# Patient Record
Sex: FEMALE | Race: WHITE | NOT HISPANIC OR LATINO | Employment: OTHER | URBAN - METROPOLITAN AREA
[De-identification: names, ages, dates, MRNs, and addresses within clinical notes are randomized per-mention and may not be internally consistent; named-entity substitution may affect disease eponyms.]

---

## 2017-01-05 ENCOUNTER — ALLSCRIPTS OFFICE VISIT (OUTPATIENT)
Dept: OTHER | Facility: OTHER | Age: 57
End: 2017-01-05

## 2017-02-20 ENCOUNTER — GENERIC CONVERSION - ENCOUNTER (OUTPATIENT)
Dept: OTHER | Facility: OTHER | Age: 57
End: 2017-02-20

## 2017-05-24 DIAGNOSIS — N18.4 CHRONIC KIDNEY DISEASE, STAGE IV (SEVERE) (HCC): ICD-10-CM

## 2017-05-24 DIAGNOSIS — N05.1 NEPHRITIC SYNDROME WITH FOCAL AND SEGMENTAL GLOMERULAR LESION: ICD-10-CM

## 2017-05-24 DIAGNOSIS — E55.9 VITAMIN D DEFICIENCY: ICD-10-CM

## 2017-05-24 DIAGNOSIS — N18.30 CHRONIC KIDNEY DISEASE, STAGE III (MODERATE) (HCC): ICD-10-CM

## 2017-05-26 ENCOUNTER — ALLSCRIPTS OFFICE VISIT (OUTPATIENT)
Dept: OTHER | Facility: OTHER | Age: 57
End: 2017-05-26

## 2018-01-11 NOTE — MISCELLANEOUS
Message  I had tried to call Caroline Melton today at approximately 5:30 PM I had left a message  I had spoken with Dr Sonia Garcia from [facility] earlier this week on Wednesday  I had reviewed Gwen's recent labs as well as her progress  I had stated that it been 1 year since I had seen her in follow-up  Most recent creatinine was 2 4  Given the reactions that Mahi Doll has had to the immunosuppressive medication including Prograf prednisone and Acthar as well as of not being effective in helping reduce her proteinuria unless she had severe hypoalbuminemia the risks would outweigh the benefits in terms of utilizing immunosuppressive therapy at this point  I left a message with Mahi Doll stating that I had spoken with Dr Sonia Garcia  In addition, I stated her most repeat creatinine was 2 1  Note her serum albumin is 3 7  I would want to try low-dose ACE inhibitor or ARB  I will do a follow-up phone call I did leave a message concerning the above today on October 27   Thank you      Signatures   Electronically signed by : Nimisha Burton DO; Oct 27 2016  6:20PM EST                       (Author)

## 2018-01-14 VITALS
BODY MASS INDEX: 36.37 KG/M2 | HEIGHT: 68 IN | DIASTOLIC BLOOD PRESSURE: 80 MMHG | WEIGHT: 240 LBS | SYSTOLIC BLOOD PRESSURE: 114 MMHG

## 2018-01-14 VITALS
HEIGHT: 68 IN | WEIGHT: 248 LBS | DIASTOLIC BLOOD PRESSURE: 92 MMHG | BODY MASS INDEX: 37.59 KG/M2 | SYSTOLIC BLOOD PRESSURE: 132 MMHG

## 2018-01-16 NOTE — MISCELLANEOUS
Message  I had spoken with patient and her  on the phone this past tuesday  They had called me on my cell; on her prior visit after the recent hospitalization in [place], I had asked her to see cardiology in followup given the was found to have PAF and was asymptomatic  I appreciated her going to see the cardiologist in followup; she had called because of thePAF they were recommending anticoagulation  We spoke of the NOACS versus coumadin as the heart doctor was recommending coumadin given the CKD  I agreed with this thought process and thought coumadin was the safest option  I stated some of her lab work was not yet back and I would call her in folllowup   I will call [facility]      Signatures   Electronically signed by : Jurgen Patel DO; Sep 16 2016  9:33AM EST                       (Author)

## 2018-01-16 NOTE — MISCELLANEOUS
Message  I was called by the patent on friday as she was concerned and asking me about anticoagulation  Shehad seen her cardiologist and had been having more bouts of PAF  She is at increased risk of stroke  We talked about the use of eliqiuis versus coumadin  i explained that both do increase the risk of significant bleeding that she is aware of  In addition, w talked about the fact that coumadin does require close ,monitoring of levels and that she was very focused that an antidote was readily available for coumadin  I explained that eliqiuis is able to be dosed in ckd but again both did significantly increase the risk of bleeding  She is going to discuss coumadin use with cardiology further, she does understand the increased stroke risk with PAF  her last cr was 2 1 and albumin was 3 8  repeat labs and for followup in January but at this point based on my conversation with dr Bishop Cobb from [place] no further immunosuppression at this time unless there was a significant decrease in her serum albumin levels and this would have to be looked at        Signatures   Electronically signed by : Bill Ford DO; Dec 12 2016 10:36PM EST                       (Author)

## 2018-07-13 DIAGNOSIS — N18.4 CHRONIC KIDNEY DISEASE, STAGE 4 (SEVERE) (HCC): Primary | ICD-10-CM

## 2018-07-18 ENCOUNTER — DOCUMENTATION (OUTPATIENT)
Dept: NEPHROLOGY | Facility: CLINIC | Age: 58
End: 2018-07-18

## 2018-07-18 LAB
1,25(OH)2D3 SERPL-MCNC: 12 PG/ML
CREAT ?TM UR-SCNC: 120 UMOL/L
EXT BLOOD, UA: NORMAL
EXT GLUCOSE BLD: 134
EXT GLUCOSE, UA: NORMAL
EXT KETONES: NORMAL
EXT NITRITE, UA: NORMAL
EXT PH, UA: 5
EXT PROTEIN URINE: 601.8
EXT PROTEIN, UA: >500
EXT SPECIFIC GRAVITY, UA: 1.01
EXTERNAL ALBUMIN: 3.4
EXTERNAL ALK PHOS: 75
EXTERNAL ALT: 28
EXTERNAL ANION GAP: 9
EXTERNAL AST: 11
EXTERNAL BACTERIA (UA): NORMAL
EXTERNAL BUN: 58
EXTERNAL CALCIUM: 5.9
EXTERNAL CHLORIDE: 109
EXTERNAL CO2: 23
EXTERNAL CREATININE: 3.99
EXTERNAL EGFR: 12
EXTERNAL MAGNESIUM: 2.1
EXTERNAL PHOSPHORUS: 6.5
EXTERNAL POTASSIUM: 5.2
EXTERNAL RBC (UA): NORMAL
EXTERNAL SODIUM: 141
EXTERNAL T.BILIRUBIN: 0.4
EXTERNAL TOTAL PROTEIN: 7
EXTERNAL WBC (UA): NORMAL
HCT VFR BLD AUTO: 34.6 % (ref 34.8–46.1)
HGB BLD-MCNC: 11.3 G/DL (ref 11.5–15.4)
PLATELET # BLD AUTO: 165 THOUSANDS/UL (ref 149–390)
PROT/CREAT UR: 5.02 MG/G{CREAT}
WBC # BLD AUTO: 7.3 THOUSAND/UL
WBC # BLD EST: 25 10*3/UL

## 2018-07-18 NOTE — PROGRESS NOTES
Received a call from [facility]  Critically low calcium of 5 9   CR 3 99  UPC 5 02  Per Dr Debra Carias, pt to go to ER  Called patient and left two message explaining above  Was not able to talk  directly to patient as she did not answer

## 2018-07-19 ENCOUNTER — TELEPHONE (OUTPATIENT)
Dept: NEPHROLOGY | Facility: CLINIC | Age: 58
End: 2018-07-19

## 2018-07-19 NOTE — TELEPHONE ENCOUNTER
Yesterday afternoon, I was notified as to abnormal blood work by the patient  A calcium level was 5 9 with an albumin of 3 4  Given the hypocalcemia which was acute from her prior labs I had related to message that the patient should go to the emergency room further evaluation  Her protein creatinine ratio has been stable as what has been although she has had worsening kidney function  I was called by the ER physician last night at approximately 10:30 p m     The question was how to best manage this Huang  Patient was having some symptoms related to hypocalcemia including muscle spasm contraction at least as per my conversation with the ER physician  Her phosphorus level was 6 5  I had asked that an EKG had been done to get a QT interval   In addition given her hypocalcemia if she was symptomatic she may require some intravenous calcium placement  My concern with regards for the patient's safety with regards Level hypocalcemia which I explained to the ER physician was why I had as a message to be given to the patient to go to the ER in the 1st place  I am scheduled to see her in the office tomorrow to further discuss her kidney function and to make a further plan of care  The ER id is from [facility] of was responding to number from page I had gotten 1 my pager last night with regards to this patient and probably respond to that agent spoke with position in the emergency room concern is patient and my concern for her safety as well as my concern given that she possibly could have symptomatic hypocalcemia given the level of what her calcium levels were in the lab work

## 2018-07-19 NOTE — PROGRESS NOTES
Yesterday afternoon, I was notified as to abnormal blood work by the patient  A calcium level was 5 9 with an albumin of 3 4  Given the hypocalcemia which was acute from her prior labs I had related to message that the patient should go to the emergency room further evaluation  Her protein creatinine ratio has been stable as what has been although she has had worsening kidney function  I was called by the ER physician last night at approximately 10:30 p m     The question was how to best manage this Huang  Patient was having some symptoms related to hypocalcemia including muscle spasm contraction at least as per my conversation with the ER physician  Her phosphorus level was 6 5  I had asked that an EKG had been done to get a QT interval   In addition given her hypocalcemia if she was symptomatic she may require some intravenous calcium placement  My concern with regards for the patient's safety with regards Level hypocalcemia which I explained to the ER physician was why I had as a message to be given to the patient to go to the ER in the 1st place  I am scheduled to see her in the office tomorrow to further discuss her kidney function and to make a further plan of care  The ER id is from [facility] of was responding to number from page I had gotten 1 my pager last night with regards to this patient and probably respond to that agent spoke with position in the emergency room concern is patient and my concern for her safety as well as my concern given that she possibly could have symptomatic hypocalcemia given the level of what her calcium levels were in the lab work

## 2018-07-20 ENCOUNTER — OFFICE VISIT (OUTPATIENT)
Dept: NEPHROLOGY | Facility: CLINIC | Age: 58
End: 2018-07-20
Payer: COMMERCIAL

## 2018-07-20 VITALS — HEIGHT: 68 IN | BODY MASS INDEX: 38.19 KG/M2 | WEIGHT: 252 LBS

## 2018-07-20 DIAGNOSIS — D63.1 ANEMIA IN STAGE 4 CHRONIC KIDNEY DISEASE (HCC): ICD-10-CM

## 2018-07-20 DIAGNOSIS — N05.1 FOCAL SEGMENTAL GLOMERULOSCLEROSIS: ICD-10-CM

## 2018-07-20 DIAGNOSIS — I48.0 PAROXYSMAL ATRIAL FIBRILLATION (HCC): ICD-10-CM

## 2018-07-20 DIAGNOSIS — N18.4 ANEMIA IN STAGE 4 CHRONIC KIDNEY DISEASE (HCC): ICD-10-CM

## 2018-07-20 DIAGNOSIS — N25.81 SECONDARY HYPERPARATHYROIDISM (HCC): ICD-10-CM

## 2018-07-20 DIAGNOSIS — N18.4 CKD (CHRONIC KIDNEY DISEASE), STAGE IV (HCC): ICD-10-CM

## 2018-07-20 DIAGNOSIS — E83.51 HYPOCALCEMIA: Primary | ICD-10-CM

## 2018-07-20 PROBLEM — N18.9 ANEMIA IN CHRONIC KIDNEY DISEASE: Status: ACTIVE | Noted: 2018-07-20

## 2018-07-20 PROCEDURE — 99214 OFFICE O/P EST MOD 30 MIN: CPT | Performed by: INTERNAL MEDICINE

## 2018-07-20 RX ORDER — MULTIVITAMIN
1 TABLET ORAL DAILY
COMMUNITY

## 2018-07-20 RX ORDER — OMEGA-3 FATTY ACIDS CAP DELAYED RELEASE 1000 MG 1000 MG
CAPSULE DELAYED RELEASE ORAL
COMMUNITY

## 2018-07-20 RX ORDER — ERGOCALCIFEROL (VITAMIN D2) 1250 MCG
50000 CAPSULE ORAL WEEKLY
COMMUNITY

## 2018-07-20 RX ORDER — CALCITRIOL 0.25 UG/1
0.25 CAPSULE, LIQUID FILLED ORAL DAILY
COMMUNITY
End: 2018-08-23 | Stop reason: SDUPTHER

## 2018-07-20 NOTE — ASSESSMENT & PLAN NOTE
The patient stated that she likely felt that she had episodes of paroxysmal atrial fibrillation  She is due to see Cardiology this afternoon  I explained that they would likely be talking with her about possible anticoagulation given that she has had episodes of atrial fibrillation over the past several months  Her chads Vasc score may be about 2 given that she is a female in history of hypertension  I stated she did speak further about this with Cardiology and she is due to see Dr Low Chow whom she had seen in the past later this afternoon

## 2018-07-20 NOTE — ASSESSMENT & PLAN NOTE
Phosphorus is 6 5 will check intact PTH patient had symptomatic hypocalcemia as noted above    Add phosphorus binder calcium based phosphorus binder

## 2018-07-20 NOTE — ASSESSMENT & PLAN NOTE
I have not seen the patient in follow-up since May 2017  Note the patient has a history of biopsy-proven focal segmental glomerulosclerosis  Over the past several years, she has been on prior medication regimens including Prograf, prednisone, and ACTHAR which did not have a significant impact on her kidney function or her proteinuria  At this point I think were in more the advanced stage of FSGS    She does have proteinuria and a protein creatinine ratio 9 g which is higher than it had been in the past as it was 5 g in 2017

## 2018-07-20 NOTE — ASSESSMENT & PLAN NOTE
The patient on the blood work that she had done in the middle of the week had a calcium of 5 9 and albumin in the mid threes and corrected was in the low 6 range  I had asked that the patient go to the emergency room  I had spoken with the ER physician when I was on call on Wednesday  The patient was admitted for symptomatic hypocalcemia  Hospitalization notes were reviewed  I spoke with the nephrologist and the patient had left against medical advice yesterday  She is no longer having symptoms of hypocalcemia  She denies any perioral numbness  No twitching of her hands no numbness or tingling  She did receive intravenous calcium yesterday  She had repeat labs this morning which are pending  I am going to maintain a daily Rocaltrol but decreased to once daily, maintain the all [place]scal but decrease it to twice daily in between meals and maintain the calcium acetate 1 with each meal for hyperphosphatemia  Intact PTH is pending  She has low vitamin-D levels which is also contributing and she [place] [address] units of vitamin D2 which we are also going to maintain     Will recheck a CMP and phosphorus this coming Tuesday

## 2018-07-20 NOTE — PROGRESS NOTES
Assessment/Plan:    CKD (chronic kidney disease), stage IV ([facility])  The S the Patient has stage 4 chronic kidney disease  She has biopsy-proven focal segmental glomerulosclerosis  The patient I last saw here in May 2017  At that time, her creatinine 2 3-2 7 range  It is likely that her kidney function has represented a progression of underlying focal segmental glomerulosclerosis  When I had gotten her labs in the middle of the week, her creatinine had been 3 9 but her calcium corrected was in the low 6 range she was admitted with symptomatic hypocalcemia  I spoke with the rounding physician/nephrologist in the hospital yesterday and her creatinine had been 4 1  During today's visit we had talked about getting ready for dialysis and different modalities of dialysis     This was a lot for her to take in and she was mildly to her full during his visit as we talked about preparation for dialysis  I did offer her as well the kidney based probiotic renadyl to try and see if this would help to delay time with regards to initiating dialysis  I am going to start her on the kidney based probiotic 1 tablet twice daily if she tolerates increasing it to 3 tablets a day taken with meals  In addition, I asked her to think about different modalities and we will set up modality review with regards to different types of dialysis which she was amenable to  Will also check a repeat BMP in approximately 4 weeks as well after she has been on a probiotic for couple weeks  I explained that in my own experience, I had about a 50% success rate in terms of lowering the creatinine level    I think his I explained my hypothesis is that there is likely a difference in everyone's individual bowel carola however we would follow her kidney functions how she responds    Hypocalcemia  The patient on the blood work that she had done in the middle of the week had a calcium of 5 9 and albumin in the mid threes and corrected was in the low 6 range   I had asked that the patient go to the emergency room  I had spoken with the ER physician when I was on call on Wednesday  The patient was admitted for symptomatic hypocalcemia  Hospitalization notes were reviewed  I spoke with the nephrologist and the patient had left against medical advice yesterday  She is no longer having symptoms of hypocalcemia  She denies any perioral numbness  No twitching of her hands no numbness or tingling  She did receive intravenous calcium yesterday  She had repeat labs this morning which are pending  I am going to maintain a daily Rocaltrol but decreased to once daily, maintain the all [place]scal but decrease it to twice daily in between meals and maintain the calcium acetate 1 with each meal for hyperphosphatemia  Intact PTH is pending  She has low vitamin-D levels which is also contributing and she was started in the hospital on [address] units of vitamin D2 which we are also going to maintain   Will recheck a CMP and phosphorus this coming Tuesday    Secondary hyperparathyroidism ([address] )  Phosphorus is 6 5 will check intact PTH patient had symptomatic hypocalcemia as noted above  Add phosphorus binder calcium based phosphorus binder    Focal segmental glomerulosclerosis  I have not seen the patient in follow-up since May 2017  Note the patient has a history of biopsy-proven focal segmental glomerulosclerosis  Over the past several years, she has been on prior medication regimens including Prograf, prednisone, and ACTHAR which did not have a significant impact on her kidney function or her proteinuria  At this point I think were in more the advanced stage of FSGS  She does have proteinuria and a protein creatinine ratio 9 g which is higher than it had been in the past as it was 5 g in 2017    Anemia in chronic kidney disease  Her hemoglobin is stable 11 2      Paroxysmal atrial fibrillation (H[facility])  The patient stated that she likely felt that she had episodes of paroxysmal atrial fibrillation  She is due to see Cardiology this afternoon  I explained that they would likely be talking with her about possible anticoagulation given that she has had episodes of atrial fibrillation over the past several months  Her chads Vasc score may be about 2 given that she is a female in history of hypertension  I stated she did speak further about this with Cardiology and she is due to see Dr Lora Randolph whom she had seen in the past later this afternoon  I also spoke with her about undergoing a transplant evaluation  I did mention that FSGS can recur in the transplanted kidney, however, I did urge her as well to consider undergoing a transplant evaluation  I will follow-up with her in 4 weeks and will discuss this further  All questions answered during this visit with Shannan Gomes and her   Subjective:      Patient ID: Gaurav Dickerson is a 56 y o  female  HPI    Patient is seen in follow-up for stage 4 chronic kidney disease  Last time I had seen Shannan Gomes here in the office was May 2017  She has a history of biopsy-proven focal segmental glomerulosclerosis  She had been taking care family members over the past year  She states that she may have had bouts of atrial fibrillation in the past year  Labs had been done in the middle of the week which demonstrated a creatinine of 3 9 calcium 5 5 with an albumin of 3 4  I had asked that the patient go to the emergency room  I was on call on Wednesday night and I explained to the ER physician as ania had gone to [facility] meal number that I was concerned about symptomatic hypocalcemia that the patient was experiencing as she had perioral numbness as well as twitching in her hands and numbness and tingling  The patient was hospitalized given intravenous calcium  The patient signed out against medical advice on Thursday if she was due to see me today    Her discharge medications were reviewed and she is no longer having symptoms of hypocalcemia  She denies any further numbness and tingling no perioral numbness  Medications were reviewed  I also had spoken with the [facility] nephrologist yesterday  Dr Tony Oneal  Review of Systems  currently, no nausea vomiting diarrhea abdominal discomfort  No dizziness no lightheadedness no urgency no frequency  There is no perioral numbness, no further twitching no numbness no tingling    Objective:      Ht 5' 8" (1 727 m)   Wt 114 kg (252 lb)   BMI 38 32 kg/m²     Blood pressure is 128/90 in the right arm     Physical Exam   Constitutional: She is oriented to person, place, and time  She appears well-nourished  Eyes: No scleral icterus  Neck: Neck supple  Cardiovascular: Normal rate and regular rhythm  Exam reveals no friction rub  Pulmonary/Chest: Breath sounds normal    Abdominal: Soft  Bowel sounds are normal    Musculoskeletal: She exhibits no edema  Lymphadenopathy:     She has no cervical adenopathy  Neurological: She is alert and oriented to person, place, and time  Skin: Skin is warm and dry       there is no carpal pedal spasm when patient's blood pressure was taken in the right arm

## 2018-07-20 NOTE — ASSESSMENT & PLAN NOTE
The S the Patient has stage 4 chronic kidney disease  She has biopsy-proven focal segmental glomerulosclerosis  The patient I last saw here in May 2017  At that time, her creatinine 2 3-2 7 range  It is likely that her kidney function has represented a progression of underlying focal segmental glomerulosclerosis  When I had gotten her labs in the middle of the week, her creatinine had been 3 9 but her calcium corrected was in the low 6 range she was admitted with symptomatic hypocalcemia  I spoke with the rounding physician/nephrologist in the hospital yesterday and her creatinine had been 4 1  During today's visit we had talked about getting ready for dialysis and different modalities of dialysis     This was a lot for her to take in and she was mildly to her full during his visit as we talked about preparation for dialysis  I did offer her as well the kidney based probiotic renadyl to try and see if this would help to delay time with regards to initiating dialysis  I am going to start her on the kidney based probiotic 1 tablet twice daily if she tolerates increasing it to 3 tablets a day taken with meals  In addition, I asked her to think about different modalities and we will set up modality review with regards to different types of dialysis which she was amenable to  Will also check a repeat BMP in approximately 4 weeks as well after she has been on a probiotic for couple weeks  I explained that in my own experience, I had about a 50% success rate in terms of lowering the creatinine level    I think his I explained my hypothesis is that there is likely a difference in everyone's individual bowel carola however we would follow her kidney functions how she responds

## 2018-07-30 LAB
EXT DIFF-ABS BASOPHILS: 0.1
EXT DIFF-ABS EOSINOPHILS: 0.1
EXT DIFF-ABS LYMPHOCYTES: 2.2
EXT DIFF-ABS MONOCYTES: 0.5
EXT DIFF-ABS NEUTROPHILS: 6
EXT GLUCOSE BLD: 113
EXT GLUCOSE BLD: 93
EXTERNAL ALBUMIN: 4.1
EXTERNAL ALBUMIN: 4.3
EXTERNAL ALK PHOS: 71
EXTERNAL ALT: 20
EXTERNAL AST: 17
EXTERNAL BICARBONATE: 20
EXTERNAL BUN: 55
EXTERNAL BUN: 57
EXTERNAL CALCIUM: 7.5
EXTERNAL CALCIUM: 9
EXTERNAL CHLORIDE: 101
EXTERNAL CHLORIDE: 102
EXTERNAL CO2: 19
EXTERNAL CREATININE: 3.68
EXTERNAL CREATININE: 4.11
EXTERNAL EGFR: 13
EXTERNAL FERRITIN: 117
EXTERNAL HEMATOCRIT: 36 %
EXTERNAL HEMOGLOBIN: 11.6 G/DL
EXTERNAL MAGNESIUM: 2.1
EXTERNAL MAGNESIUM: 2.2
EXTERNAL MCV: 91
EXTERNAL PHOSPHORUS: 4.8
EXTERNAL PHOSPHORUS: 6.5
EXTERNAL PLATELET COUNT: 196 K/ΜL
EXTERNAL POTASSIUM: 5.4
EXTERNAL POTASSIUM: 5.5
EXTERNAL PTH: 54
EXTERNAL PTH: 87
EXTERNAL RBC: 3.9
EXTERNAL RDW: 13.8
EXTERNAL SODIUM: 140
EXTERNAL SODIUM: 141
EXTERNAL T.BILIRUBIN: 0.3
EXTERNAL TIBC: 330
EXTERNAL TOTAL PROTEIN: 7
EXTERNAL WBC: 8.9
IRON SATN MFR SERPL: 26 %
IRON SERPL-MCNC: 87 UG/DL (ref 50–170)

## 2018-08-02 ENCOUNTER — TELEPHONE (OUTPATIENT)
Dept: OTHER | Facility: HOSPITAL | Age: 58
End: 2018-08-02

## 2018-08-02 NOTE — TELEPHONE ENCOUNTER
I returned the patient's message via a phone call as she had asked about referral to either seeing a vascular surgeon for a fistula or having a PD catheter placed    I tried calling her number at [phone] and  Left message saying I would call her to discuss the question she had asked about

## 2018-08-06 DIAGNOSIS — N25.81 SECONDARY HYPERPARATHYROIDISM (HCC): Primary | ICD-10-CM

## 2018-08-07 ENCOUNTER — TELEPHONE (OUTPATIENT)
Dept: OTHER | Facility: HOSPITAL | Age: 58
End: 2018-08-07

## 2018-08-07 NOTE — TELEPHONE ENCOUNTER
I attempted to call the patient again today at [phone]  I left a brief message   I will try and call back later

## 2018-08-10 ENCOUNTER — TELEPHONE (OUTPATIENT)
Dept: OTHER | Facility: HOSPITAL | Age: 58
End: 2018-08-10

## 2018-08-10 DIAGNOSIS — D64.9 ANEMIA: ICD-10-CM

## 2018-08-10 DIAGNOSIS — N05.1 FSGS (FOCAL SEGMENTAL GLOMERULOSCLEROSIS): Primary | ICD-10-CM

## 2018-08-22 PROBLEM — E83.51 HYPOCALCEMIA: Status: RESOLVED | Noted: 2018-07-20 | Resolved: 2018-08-22

## 2018-08-22 NOTE — ASSESSMENT & PLAN NOTE
Phosphorus is 4 8 repeat intact PTH is 54 maintain PhosLo 1 tab with each meal and decreased Rocaltrol to weekly  Patient is also on vitamin D2 weekly    Her hypocalcemia has improved her last calcium was 9 corrected

## 2018-08-22 NOTE — ASSESSMENT & PLAN NOTE
Patient is been refractory to immunosuppression for this  Again  Please see prior notes with regards to this  I have not seen the patient in over urine have  Likely her creatinine to 3 5- 4 0 represents progression of underlying chronic kidney disease    Other plans as noted above

## 2018-08-22 NOTE — ASSESSMENT & PLAN NOTE
Meka Barnes has stage 4 chronic kidney disease borderline stage 5 secondary to biopsy-proven focal segmental glomerulosclerosis  She has been refractory to immunosuppressive therapy as well as not tolerating side effects of immunosuppressive therapy as delineated in prior notes  We had started a kidney based probiotic to see if it would have some affect  Her creatinine seems to be running anywhere from 3 6-4 0  Last blood work showed a creatinine of 4 1  Need to recheck this month  Patient is being evaluated for a kidney transplant however there is a risk of focal segmental glomerulosclerosis recurring after transplant I will defer to the transplant center further discussing this risk with her  She is planning to go to [place] for the next few months and has an appointment with a nephrologist down in [place] to establish care down there  She plans on traveling to [place] and [place]  She has an appointment with [address] [place] [address] nephrology Center on September 5th and then will be going down to [place] and I believe seen not only Keila Lozano and nephrologist down there but also a transplant nephrologist at [facility]  Her creatinine is 4 0 on most recent blood work and she has been anywhere between 3 5-4 0 range  She has no uremic symptoms and does not require immediate dialysis  He did talk about making a plan that she has visited dialysis centers in terms of discussing both hemodialysis and peritoneal dialysis however she has not yet made up her mind  She does not want to make any definite plans that she plans on going to [place] on September 15th and proceeding from there  I do think she would do agree with peritoneal dialysis and that she should consider having a peritoneal dialysis catheter placed keeping it proved until such time she would need to initiate dialysis  She and her  wish to think about this further

## 2018-08-23 ENCOUNTER — OFFICE VISIT (OUTPATIENT)
Dept: NEPHROLOGY | Facility: CLINIC | Age: 58
End: 2018-08-23
Payer: COMMERCIAL

## 2018-08-23 VITALS — BODY MASS INDEX: 36.89 KG/M2 | HEIGHT: 68 IN | WEIGHT: 243.4 LBS

## 2018-08-23 DIAGNOSIS — N25.81 SECONDARY HYPERPARATHYROIDISM (HCC): ICD-10-CM

## 2018-08-23 DIAGNOSIS — D63.1 ANEMIA IN STAGE 4 CHRONIC KIDNEY DISEASE (HCC): ICD-10-CM

## 2018-08-23 DIAGNOSIS — N18.4 CKD (CHRONIC KIDNEY DISEASE), STAGE IV (HCC): Primary | ICD-10-CM

## 2018-08-23 DIAGNOSIS — N05.1 FOCAL SEGMENTAL GLOMERULOSCLEROSIS: ICD-10-CM

## 2018-08-23 DIAGNOSIS — N18.4 ANEMIA IN STAGE 4 CHRONIC KIDNEY DISEASE (HCC): ICD-10-CM

## 2018-08-23 DIAGNOSIS — E83.51 HYPOCALCEMIA: ICD-10-CM

## 2018-08-23 PROCEDURE — 99214 OFFICE O/P EST MOD 30 MIN: CPT | Performed by: INTERNAL MEDICINE

## 2018-08-23 RX ORDER — CALCITRIOL 0.25 UG/1
0.25 CAPSULE, LIQUID FILLED ORAL WEEKLY
Qty: 5 CAPSULE | Refills: 5 | Status: SHIPPED | OUTPATIENT
Start: 2018-08-23

## 2018-08-23 NOTE — PROGRESS NOTES
Assessment/Plan:    CKD (chronic kidney disease), stage IV ([facility] [address] )   Nat Osborne has stage 4 chronic kidney disease borderline stage 5 secondary to biopsy-proven focal segmental glomerulosclerosis  She has been refractory to immunosuppressive therapy as well as not tolerating side effects of immunosuppressive therapy as delineated in prior notes  We had started a kidney based probiotic to see if it would have some affect  Her creatinine seems to be running anywhere from 3 6-4 0  Last blood work showed a creatinine of 4 1  Need to recheck this month  Patient is being evaluated for a kidney transplant however there is a risk of focal segmental glomerulosclerosis recurring after transplant I will defer to the transplant center further discussing this risk with her  She is planning to go to [place] for the next few months and has an appointment with a nephrologist down in [place] to establish care down there  She plans on traveling to [place] and [place]  She has an appointment with [address] [place] [address] nephrology Center on September 5th and then will be going down to [place] and I believe seen not only Lolly Solorio and nephrologist down there but also a transplant nephrologist at [facility]  Her creatinine is 4 0 on most recent blood work and she has been anywhere between 3 5-4 0 range  She has no uremic symptoms and does not require immediate dialysis  He did talk about making a plan that she has visited dialysis centers in terms of discussing both hemodialysis and peritoneal dialysis however she has not yet made up her mind  She does not want to make any definite plans that she plans on going to [place] on September 15th and proceeding from there  I do think she would do agree with peritoneal dialysis and that she should consider having a peritoneal dialysis catheter placed keeping it proved until such time she would need to initiate dialysis    She and her  wish to think about this further  Secondary hyperparathyroidism ([address] )   Phosphorus is 4 8 repeat intact PTH is 54 maintain PhosLo 1 tab with each meal and decreased Rocaltrol to weekly  Patient is also on vitamin D2 weekly  Her hypocalcemia has improved her last calcium was 9 corrected    Hypocalcemia   This has resolved, her calcium is 9 0 albumin is 4 3  Anemia in chronic kidney disease    Hemoglobin is stable 11 6 continue to follow    Focal segmental glomerulosclerosis   Patient is been refractory to immunosuppression for this  Again  Please see prior notes with regards to this  I have not seen the patient in over urine have  Likely her creatinine to 3 5- 4 0 represents progression of underlying chronic kidney disease  Other plans as noted above    Regarding other aspects of kidney disease:  Her bicarbonate level is 24 her potassium 4 9  This is from labs from August 13        Subjective:      Patient ID: Ammon Krueger is a 56 y o  female  HPI    She is here with her  for follow-up regarding stage IV borderline stage 5 chronic kidney disease  She is scheduled to visit with the transplant center at [address] [place] [address] and she will be [place] in mid [place] and staying there several months and has made arrangements to initiate care with Nephrology down in [facility] [place]  I had asked her to give me the name of the nephrologist as for continuity of care I would be more than willing to speak with him as I have known her for the past several years  She denies any uremic symptoms  No nausea no vomiting no diarrhea no urgency no frequency no hematuria no shortness of breath no leg swelling no metallic taste no itching no hiccups  Labs and medications are reviewed    Review of Systems  as above    Objective:      Ht 5' 8" (1 727 m)   Wt 110 kg (243 lb 6 4 oz)   BMI 37 01 kg/m²          Physical Exam   Constitutional: She appears well-nourished     Eyes: No scleral icterus  Neck: Neck supple  Cardiovascular: Regular rhythm  Exam reveals no friction rub  Pulmonary/Chest: Effort normal and breath sounds normal    Abdominal: Soft  Bowel sounds are normal    Musculoskeletal: She exhibits no edema  Lymphadenopathy:     She has no cervical adenopathy  Neurological:   Nonfocal no asterixis noted   Skin: Skin is warm and dry     Psychiatric: Her behavior is normal  Thought content normal

## 2018-08-23 NOTE — PATIENT INSTRUCTIONS
1  Please stop the calcium - os mikayla     2  Please maintain the calcium acetate (phoslo) - one with each meal and with snacks  3  I reordered the Rocaltrol, which is a special kind of kidney specific vitamin d- to take weekly, you can take this every Monday  4  You are also taking regular vitamin d weekly continue that as well     5  We reordered kidney function panel for the first week of September    6  Thank you for coming in to see me today

## 2018-09-14 DIAGNOSIS — E87.5 HYPERKALEMIA: Primary | ICD-10-CM

## 2018-09-14 LAB
EXT GLUCOSE BLD: 90
EXTERNAL ALBUMIN: 3.9
EXTERNAL ALK PHOS: 75
EXTERNAL ALT: 29
EXTERNAL ANION GAP: 9
EXTERNAL AST: 14
EXTERNAL BICARBONATE: 24
EXTERNAL BUN: 46
EXTERNAL CALCIUM: 8.2
EXTERNAL CHLORIDE: 104
EXTERNAL CREATININE: 4.07
EXTERNAL EGFR: 12
EXTERNAL POTASSIUM: 4.9
EXTERNAL SODIUM: 137
EXTERNAL T.BILIRUBIN: 0.5
EXTERNAL TOTAL PROTEIN: 7.5

## 2018-09-14 RX ORDER — SODIUM BICARBONATE 325 MG/1
325 TABLET ORAL 3 TIMES DAILY
Qty: 60 TABLET | Refills: 3 | Status: SHIPPED | OUTPATIENT
Start: 2018-09-14

## 2018-09-14 NOTE — PROGRESS NOTES
I am going to order sodium bicarbonate 325 meq bid given her high K level and low bicarb,also will order kayexelate 15 grams po times one as K is 5 8  She will need bmp next week  I will order that as well

## 2018-09-14 NOTE — PROGRESS NOTES
Actually given history of diverticulitis I will hold off on kayexelate  I will increase sodiumbicarb to tid and repeat bmp next week   thanks

## 2018-09-17 ENCOUNTER — TELEPHONE (OUTPATIENT)
Dept: OTHER | Facility: HOSPITAL | Age: 58
End: 2018-09-17

## 2018-09-17 NOTE — TELEPHONE ENCOUNTER
I spoke with the patient and her  at length this morning  I reviewed the recent blood work that they had  I emphasized to Sanju Santos that her kidney function has remained stable with a creatinine of 4 2 and was 1 1 in July  She has recently begun testing at the [address] [place] [address] with regards to kidney transplant and had seen Morgan Orozco there  She states she is in the process of having 2 donors tested  In addition, she had gone for modality review for dialysis at [facility]  She denies any uremic symptoms  I talked at length with her and her  with regards to differences regarding peritoneal dialysis and hemodialysis  I stated I would try and call her transplant nephrologist at [address] [place] [address] in that the patient would want to see if it was possible to have a pre-emptive transplant done to avoid doing dialysis in the 1st place  Talked about with her GFR being at 10% at least having a discussion of peritoneal dialysis versus  home hemodialysis  All questions were answered  Given her hyperkalemia potassium is 5 8 starting on sodium bicarbonate and will recheck a BMP later this week  There is no urgent need for hemodialysis her creatinine has been staying anywhere between 3 8-4 2 range for the past few months  All questions answered to the best of my ability

## 2019-01-02 ENCOUNTER — TELEPHONE (OUTPATIENT)
Dept: NEPHROLOGY | Facility: CLINIC | Age: 59
End: 2019-01-02

## 2019-01-02 NOTE — TELEPHONE ENCOUNTER
Pt on recall for July with Dr Florence     ----- Message from Bk Boston MD sent at 1/2/2019  1:43 PM EST -----  Hello    Pt normally follows with Dr Kevin Finley   Can pt have f/u post transplant appointment with me (this was discussed with Dr Kevin Finley) 6 months post transplant once cleared from [facility]  (should be around June or July of this year for appointment with us)    Thank you    jade